# Patient Record
(demographics unavailable — no encounter records)

---

## 2025-02-07 NOTE — DISCUSSION/SUMMARY
[FreeTextEntry1] : Ms. Alanna Spencer is a pleasant 90-year-old woman with permanent Atrial Fibrillation, dyslipidemia, hypertension, COPD, pauses, tachy-lupe syndrome, intermittent dizziness, palpitations.  Patient had Event monitor due to dizziness and palpitations. Monitor showed AF with RVR and multiple episodes of Bradycardia and pauses. Digoxin was stopped.  I recommend to continue same medications.  I recommend Leadless pacemaker implant AVEIR single chamber followed by titration of BB. She underwent Aveir SC implant on 8/1/2024.

## 2025-02-07 NOTE — ASSESSMENT
[FreeTextEntry1] : Tachy lupe s/p Aveir SC leadless PPM implant (non-dependent)  - Device interrogation normal. I interrogated and reprogrammed the device as described above.  - No events.   HTN - BP high, however patient is very upset she is unable to see Dr. Pierre. - Follow up with PCP Dr. Boogie Quick AF - On Xarelto 15mg daily. No s/s bleeding reported.  - Blood work per PCP. I recommend CBC, CMP at least twice yearly.   RTO in 6 months on Monday with ACP when Dr. Pierre is here

## 2025-02-07 NOTE — CARDIOLOGY SUMMARY
[de-identified] : (6/18/2024) Atrial Fibrillation at 78 bpm, non-specific T wave abnormalities [de-identified] : (6/20/2024) TTE:  1. Normal global left ventricular systolic function.  2. Moderate to severe left atrial enlargement.  3. LV Ejection Fraction by Fatima's Method with a biplane EF of 65 %.  4. Mildly increased LV wall thickness.  5. The mitral in-flow pattern reveals no discernable A-wave, therefore no comment on diastolic function can be made.  6. Normal right ventricular size and function.  7. Mildly enlarged right atrium.  8. Moderate mitral valve regurgitation.  9. Mild tricuspid regurgitation. 10. Sclerotic aortic valve with normal opening. 11. Mild pulmonic valve regurgitation.

## 2025-02-07 NOTE — PHYSICAL EXAM
[Well Developed] : well developed [Well Nourished] : well nourished [No Acute Distress] : no acute distress [Normal Conjunctiva] : normal conjunctiva [Normal Venous Pressure] : normal venous pressure [No Carotid Bruit] : no carotid bruit [No Murmur] : no murmur [No Rub] : no rub [No Gallop] : no gallop [Clear Lung Fields] : clear lung fields [Good Air Entry] : good air entry [No Respiratory Distress] : no respiratory distress  [Soft] : abdomen soft [Non Tender] : non-tender [No Masses/organomegaly] : no masses/organomegaly [Normal Bowel Sounds] : normal bowel sounds [Normal Gait] : normal gait [No Edema] : no edema [No Cyanosis] : no cyanosis [No Clubbing] : no clubbing [No Varicosities] : no varicosities [No Rash] : no rash [No Skin Lesions] : no skin lesions [Moves all extremities] : moves all extremities [No Focal Deficits] : no focal deficits [Normal Speech] : normal speech [Alert and Oriented] : alert and oriented [Normal memory] : normal memory [General Appearance - Well Developed] : well developed [Normal Appearance] : normal appearance [Well Groomed] : well groomed [General Appearance - Well Nourished] : well nourished [No Deformities] : no deformities [General Appearance - In No Acute Distress] : no acute distress [Heart Rate And Rhythm] : heart rate and rhythm were normal [Heart Sounds] : normal S1 and S2 [] : no respiratory distress [Abdomen Soft] : soft [Nail Clubbing] : no clubbing of the fingernails [Cyanosis, Localized] : no localized cyanosis [de-identified] : irregularly irregular S1S2 [FreeTextEntry2] : right inguinal puncture site well healed

## 2025-02-07 NOTE — REASON FOR VISIT
[Follow-up Device Check] : is here today for a follow-up device check visit for [Arrhythmia/ECG Abnorrmalities] : arrhythmia/ECG abnormalities [FreeTextEntry3] : Dr. Marlon Hyman - Dr. Thomas Shelton

## 2025-02-07 NOTE — PROCEDURE
[No] : not [Atrial Fibrillation] : atrial fibrillation [See Device Printout] : See device printout [Pacemaker] : pacemaker [VVI] : VVI [Lead Imp:  ___ohms] : lead impedance was [unfilled] ohms [Sensing Amplitude ___mv] : sensing amplitude was [unfilled] mv [___V @] : [unfilled] V [___ ms] : [unfilled] ms [Sense ___ %] : Sense [unfilled]% [Pace ___ %] : Pace [unfilled]% [de-identified] : Abbott [de-identified] : Danyell DORANTES [de-identified] : [id number] [de-identified] : 8/1/2024 [de-identified] : 40 [de-identified] : 22.4 years [de-identified] : No events

## 2025-02-07 NOTE — HISTORY OF PRESENT ILLNESS
[FreeTextEntry1] : permanent Atrial Fibrillation, dyslipidemia, hypertension, COPD, pauses, tachy-lupe syndrome, intermittent dizziness, palpitations.  Patient had Event monitor due to dizziness and palpitations. Monitor showed AF with RVR and multiple episodes of Bradycardia and pauses. Digoxin was stopped.  Patient has no chest pain, no angina, no MOY, no syncope.   She underwent placement of single chamber Aveir leadless pacemaker on 8/1/2024. She presents for WCK and follow up.   The patient is feeling well and has no cardiac complaints. Denies CP, palpitations, SOB, dizziness, MOY, PND, syncope.

## 2025-04-29 NOTE — COUNSELING
[de-identified] : Pt was informed about CNs role/ STARS program and overview of transitional care reviewed with patient. In addition, yellow contact card was provided. Pt educated on topics of importance such as compliance with all provider visits, prescribed medication regimen, and low salt diet. Pt encouraged calling CN with any issues, concerns or questions, also educated to notify CN if experiencing CP, SOB , cough, increased mucus/phlegm production, abdominal discomfort/swelling, difficulty sleeping or lying flat, fever, chills, fatigue, weight gain of 2-3lbs in 24 hours or 5lbs in one week,  dizziness, lightheadedness, n/v/d/c, swelling to extremities and/or any signs of CHF exacerbation as reviewed. Reassurance provided. Will continue to monitor  Pt was informed about CNs role/ STARS program and overview of transitional care reviewed with patient. Pt educated on topics of importance such as compliance with prescribed medication regimen, COPD action plan and escalation process, adequate hydration, and proper diet. Pt encouraged calling CN with any issues, concerns or questions, also educated to notify CN if experiencing CP, SOB, cough, increased mucus production, increased use of rescue inhaler, fever, chills, fatigue, chest cold symptoms dizziness, lightheadedness, n/v/d/c, swelling to extremities and/or any signs of COPD exacerbation/flare as reviewed. Reassurance provided. Will continue to monitor

## 2025-04-29 NOTE — ASSESSMENT
[FreeTextEntry1] : Patient is a 90 year old female enrolled in the \[facility]\"" TCM program s/p a recent discharge from [facility] on 4/24/25 - 4/24/25 for chf. PMH afib, htn, hld, chf, copd, tachy lupe syndrome s/p ppm. Patient was diuresed, given duonebs, abx and sent home with HCS. HCS in place. Upon arrival patient alert in NAD, denies CP, SOB, edema, fever, chills, or n/v/d. Reports feeling "something in her chest" difficulty taking a full deep breath. F/U appointment pulm 4/30/25, cardio pending, pcp pending. Patient was contacted by RN Juli Braga from TCM and medication reconciliation was done on 4/26/25, within 48 hours of discharge.

## 2025-04-29 NOTE — HISTORY OF PRESENT ILLNESS
[FreeTextEntry1] : follow up after being discharged from the hospital for chf [de-identified] : Patient is a 90 year old female enrolled in the \[facility]\"" TCM program s/p a recent discharge from [facility] on 25 - 25 for chf. PMH afib, htn, hld, chf, copd, tachy lupe syndrome s/p ppm. Patient was diuresed, given duonebs, abx and sent home with HCS. HCS in place. Upon arrival patient alert in NAD, denies CP, SOB, edema, fever, chills, or n/v/d. Reports feeling "something in her chest" difficulty taking a full deep breath. F/U appointment pulm 25, cardio pending, pcp pending. Patient was contacted by RN Juli Braga from [facility] and medication reconciliation was done on 25, within 48 hours of discharge.   Copied from [facility]: Hospital Course: 90 year old female, past medical history afib on xarelto, htn, chf ( last EF: 64%), hld, copd not on home o2, and tachy-lupe syndrome s/p ppm, who presents with chest discomfort. patient with chest discomfort that began earlier this evening with associated shortness of breath. Patient also endorses belching and nausea previously which currently resolved. Denies cough, hemoptysis, vomiting, diarrhea, syncope, fever, or sick contacts. In the ED, Vitals: 96.1, HR:72 BP:129/88 RR: 18 on RA Labs significant for Hb.8, MCV:93.2, Lipase: 63, pro-BNP:1633, Ma.7 UA positive for large LE, Small blood, WBC 24, Many bacteria CXR: Low lung volume without definite evidence of focal consolidation pleural effusion or pneumothorax. EKG : Atrial fibrillation Low voltage QRS S/p IV Lasix 40 mg, duoneb, IV mag and admitted to medicine. Patient was seen for the following conditions: 90 year old female, past medical history afib on xarelto, htn, chf, hdl, copd not on home o2, tachy-lupe syndrome s/p ppm, who presents with chest discomfort. patient with chest discomfort that began earlier this evening with associated shortness of breath. Patient with associated belching and nausea. SOB has resolved, pt on RA. #COPD not on home o2, not in exacerbation #CHF with pEF, not in exacerbation - Clear to ausculation and no peripheral edema on exam CXR: Low lung volume without definite evidence of focal consolidation pleural effusion or pneumothorax. - on RA - pro-BNP:1633 - Last echo : EF: 64% - s/p IV 40 lasix - Continue po lasix 40 mg - Sating at 94% o2 while ambulating #Chronic Afib on Xarelto #Tachy-lupe syndrome s/p ppm - Continue Xarelto #HLD - Continue Lipitor 20 mg #UTI -UA positive for large LE, Small blood, WBC 24 Many bacteria - Pt was seen at urgent care and was given nitrofurantoin 100 mg q 12 hours for 7 days, (2025-2025). -F/u urine culture - Continue with home med #Hypothyroidism - Continue levothyroxine 112 mcg qdaily  #Erythematous plaques on bilateral inferior breast with accompanied itchiness - F/u Outpatient PCP and dermatologist Patient is medically stable for discharge. Discharge discussed and approved by Dr. Chahal.

## 2025-04-29 NOTE — REVIEW OF SYSTEMS
[Fatigue] : fatigue [Constipation] : constipation [Negative] : Heme/Lymph [Fever] : no fever [Chills] : no chills [Recent Change In Weight] : ~T no recent weight change [Lower Ext Edema] : no lower extremity edema [Shortness Of Breath] : no shortness of breath [Wheezing] : no wheezing [Cough] : no cough [Dyspnea on Exertion] : no dyspnea on exertion

## 2025-04-29 NOTE — PHYSICAL EXAM
[No Acute Distress] : no acute distress [Well-Appearing] : well-appearing [Normal Sclera/Conjunctiva] : normal sclera/conjunctiva [Normal Outer Ear/Nose] : the outer ears and nose were normal in appearance [No Respiratory Distress] : no respiratory distress  [No Accessory Muscle Use] : no accessory muscle use [Clear to Auscultation] : lungs were clear to auscultation bilaterally [Normal Rate] : normal rate  [No Edema] : there was no peripheral edema [Soft] : abdomen soft [Non Tender] : non-tender [Normal Affect] : the affect was normal [Alert and Oriented x3] : oriented to person, place, and time [Normal Insight/Judgement] : insight and judgment were intact

## 2025-05-13 NOTE — REVIEW OF SYSTEMS
[Fatigue] : fatigue [Constipation] : constipation [Negative] : Heme/Lymph [Fever] : no fever [Chills] : no chills [Recent Change In Weight] : ~T no recent weight change [Lower Ext Edema] : no lower extremity edema

## 2025-05-13 NOTE — HISTORY OF PRESENT ILLNESS
[Spouse] : spouse [FreeTextEntry1] : follow up after being discharged from the hospital for chf [de-identified] : Patient is a 90 year old female enrolled in the [facility] TCM program s/p a recent discharge from [facility] on 25 - 25 for chf. PMH afib, htn, hld, chf, copd, tachy lupe syndrome s/p ppm. Patient was diuresed, given duonebs, abx and sent home without HCS. Initial home visit completed on 25. Today, upon arrival patient alert in NAD, denies CP, SOB, edema, fever, chills, or n/v/d. Reports feeling better. F/U appointment pulm 25, cardio pending, pcp pending. Patient was contacted by RN Juli Braga from [facility] and medication reconciliation was done on 25, within 48 hours of discharge.   Copied from [facility]: Hospital Course: 90 year old female, past medical history afib on xarelto, htn, chf ( last EF: 64%), hld, copd not on home o2, and tachy-lupe syndrome s/p ppm, who presents with chest discomfort. patient with chest discomfort that began earlier this evening with associated shortness of breath. Patient also endorses belching and nausea previously which currently resolved. Denies cough, hemoptysis, vomiting, diarrhea, syncope, fever, or sick contacts. In the ED, Vitals: 96.1, HR:72 BP:129/88 RR: 18 on RA Labs significant for Hb.8, MCV:93.2, Lipase: 63, pro-BNP:1633, Ma.7 UA positive for large LE, Small blood, WBC 24, Many bacteria CXR: Low lung volume without definite evidence of focal consolidation pleural effusion or pneumothorax. EKG : Atrial fibrillation Low voltage QRS S/p IV Lasix 40 mg, duoneb, IV mag and admitted to medicine. Patient was seen for the following conditions: 90 year old female, past medical history afib on xarelto, htn, chf, hdl, copd not on home o2, tachy-lupe syndrome s/p ppm, who presents with chest discomfort. patient with chest discomfort that began earlier this evening with associated shortness of breath. Patient with associated belching and nausea. SOB has resolved, pt on RA. #COPD not on home o2, not in exacerbation #CHF with pEF, not in exacerbation - Clear to ausculation and no peripheral edema on exam CXR: Low lung volume without definite evidence of focal consolidation pleural effusion or pneumothorax. - on RA - pro-BNP:1633 - Last echo : EF: 64% - s/p IV 40 lasix - Continue po lasix 40 mg - Sating at 94% o2 while ambulating #Chronic Afib on Xarelto #Tachy-lupe syndrome s/p ppm - Continue Xarelto #HLD - Continue Lipitor 20 mg #UTI -UA positive for large LE, Small blood, WBC 24 Many bacteria - Pt was seen at urgent care and was given nitrofurantoin 100 mg q 12 hours for 7 days, (2025-2025). -F/u urine culture - Continue with home med #Hypothyroidism - Continue levothyroxine 112 mcg qdaily  #Erythematous plaques on bilateral inferior breast with accompanied itchiness - F/u Outpatient PCP and dermatologist Patient is medically stable for discharge. Discharge discussed and approved by Dr. Chahal.

## 2025-05-13 NOTE — COUNSELING
[de-identified] : Pt was informed about CNs role/ STARS program and overview of transitional care reviewed with patient. In addition, yellow contact card was provided. Pt educated on topics of importance such as compliance with all provider visits, prescribed medication regimen, and low salt diet. Pt encouraged calling CN with any issues, concerns or questions, also educated to notify CN if experiencing CP, SOB , cough, increased mucus/phlegm production, abdominal discomfort/swelling, difficulty sleeping or lying flat, fever, chills, fatigue, weight gain of 2-3lbs in 24 hours or 5lbs in one week,  dizziness, lightheadedness, n/v/d/c, swelling to extremities and/or any signs of CHF exacerbation as reviewed. Reassurance provided. Will continue to monitor  Pt was informed about CNs role/ STARS program and overview of transitional care reviewed with patient. Pt educated on topics of importance such as compliance with prescribed medication regimen, COPD action plan and escalation process, adequate hydration, and proper diet. Pt encouraged calling CN with any issues, concerns or questions, also educated to notify CN if experiencing CP, SOB, cough, increased mucus production, increased use of rescue inhaler, fever, chills, fatigue, chest cold symptoms dizziness, lightheadedness, n/v/d/c, swelling to extremities and/or any signs of COPD exacerbation/flare as reviewed. Reassurance provided. Will continue to monitor

## 2025-05-13 NOTE — PHYSICAL EXAM
[No Acute Distress] : no acute distress [Well-Appearing] : well-appearing [Normal Sclera/Conjunctiva] : normal sclera/conjunctiva [Normal Outer Ear/Nose] : the outer ears and nose were normal in appearance [No Respiratory Distress] : no respiratory distress  [No Accessory Muscle Use] : no accessory muscle use [Clear to Auscultation] : lungs were clear to auscultation bilaterally [Normal Rate] : normal rate  [No Edema] : there was no peripheral edema [Soft] : abdomen soft [Non Tender] : non-tender [Normal Gait] : normal gait [Normal Affect] : the affect was normal [Alert and Oriented x3] : oriented to person, place, and time [Normal Insight/Judgement] : insight and judgment were intact [de-identified] : b/l under breast round rash, small abrasion at center, no bleeding, no discharge

## 2025-05-13 NOTE — ASSESSMENT
[FreeTextEntry1] : Patient is a 90 year old female enrolled in the hospitals TCM program s/p a recent discharge from [facility] on 4/24/25 - 4/24/25 for chf. PMH afib, htn, hld, chf, copd, tachy lupe syndrome s/p ppm. Patient was diuresed, given duonebs, abx and sent home without HCS. Initial home visit completed on 4/29/25. Today, upon arrival patient alert in NAD, denies CP, SOB, edema, fever, chills, or n/v/d. Reports feeling better. F/U appointment pulm 4/30/25, cardio pending, pcp pending. Patient was contacted by RN Juli Braga from TCM and medication reconciliation was done on 4/26/25, within 48 hours of discharge.